# Patient Record
Sex: MALE | Race: WHITE | NOT HISPANIC OR LATINO | URBAN - METROPOLITAN AREA
[De-identification: names, ages, dates, MRNs, and addresses within clinical notes are randomized per-mention and may not be internally consistent; named-entity substitution may affect disease eponyms.]

---

## 2017-11-20 ENCOUNTER — OUTPATIENT (OUTPATIENT)
Dept: OUTPATIENT SERVICES | Facility: HOSPITAL | Age: 37
LOS: 1 days | Discharge: HOME | End: 2017-11-20

## 2018-01-10 ENCOUNTER — OUTPATIENT (OUTPATIENT)
Dept: OUTPATIENT SERVICES | Facility: HOSPITAL | Age: 38
LOS: 1 days | Discharge: HOME | End: 2018-01-10

## 2018-10-23 ENCOUNTER — EMERGENCY (EMERGENCY)
Facility: HOSPITAL | Age: 38
LOS: 0 days | Discharge: HOME | End: 2018-10-23
Admitting: PHYSICIAN ASSISTANT

## 2018-10-23 VITALS
TEMPERATURE: 98 F | HEART RATE: 76 BPM | RESPIRATION RATE: 18 BRPM | DIASTOLIC BLOOD PRESSURE: 84 MMHG | SYSTOLIC BLOOD PRESSURE: 144 MMHG | OXYGEN SATURATION: 99 %

## 2018-10-23 VITALS — HEIGHT: 72 IN | WEIGHT: 190.04 LBS

## 2018-10-23 DIAGNOSIS — K08.89 OTHER SPECIFIED DISORDERS OF TEETH AND SUPPORTING STRUCTURES: ICD-10-CM

## 2018-10-23 DIAGNOSIS — Z79.1 LONG TERM (CURRENT) USE OF NON-STEROIDAL ANTI-INFLAMMATORIES (NSAID): ICD-10-CM

## 2018-10-23 DIAGNOSIS — K03.81 CRACKED TOOTH: ICD-10-CM

## 2018-10-23 DIAGNOSIS — Z88.0 ALLERGY STATUS TO PENICILLIN: ICD-10-CM

## 2018-10-23 NOTE — ED PROVIDER NOTE - NS ED ROS FT
Constitutional: no fever, chills   ENT: dental pain tooth #7, + right sided facial swelling. no throat pain, no change in voice, no excessive drooling, no ear pain/discharge, no hearing changes.   Cardiovascular: no chest pain, no sob  Respiratory: no cough, no shortness of breath  Gastrointestinal: no nausea, vomiting or abd pain  Neurological: no headache, no dizziness, no visual changes, no UE/LE weakness or paresthesias. no change in mental status. no neck pain or stiffness.

## 2018-10-23 NOTE — ED PROVIDER NOTE - MEDICAL DECISION MAKING DETAILS
Patient here for dental pain tooth #7, cracked tooth several months ago, now pain and right sided facial swelling. No fever. Patient transferred to dental clinic for further evaluation and treatment.

## 2018-10-23 NOTE — PROGRESS NOTE ADULT - SUBJECTIVE AND OBJECTIVE BOX
Patient is a 38y old  Male who presents with a chief complaint of pain and swelling on right side of face    HPI: caries on one of the front tooth and started swelling 2-3 days ago      PAST MEDICAL & SURGICAL HISTORY:  IV drug abuse  S/P small bowel resection    (   ) heart valve replacement  (   ) joint replacement  (   ) pregnancy    MEDICATIONS  (STANDING):    MEDICATIONS  (PRN):      REVIEW OF SYSTEMS      General:	    Skin/Breast:  	  Ophthalmologic:  	  ENMT:	    Respiratory and Thorax:  	  Cardiovascular:	    Gastrointestinal:	    Genitourinary:	    Musculoskeletal:	    Neurological:	    Psychiatric:	    Hematology/Lymphatics:	    Endocrine:	    Allergic/Immunologic:	    Allergies    penicillin (Rash)    Intolerances        FAMILY HISTORY:      *SOCIAL HISTORY: (   ) Tobacco; (   ) ETOH    Vital Signs Last 24 Hrs  T(C): 36.7 (23 Oct 2018 08:47), Max: 36.7 (23 Oct 2018 08:47)  T(F): 98 (23 Oct 2018 08:47), Max: 98 (23 Oct 2018 08:47)  HR: 76 (23 Oct 2018 08:47) (76 - 76)  BP: 144/84 (23 Oct 2018 08:47) (144/84 - 144/84)  BP(mean): --  RR: 18 (23 Oct 2018 08:47) (18 - 18)  SpO2: 99% (23 Oct 2018 08:47) (99% - 99%)    LABS:                  Last Dental Visit: << about an year ago as emergency  >>    EOE:  TMJ (   ) clicks                     (   ) pops                     (   ) crepitus             Mandible <<FROM>>             Facial bones and MOM <<grossly intact>>             (   ) trismus             (   ) lymphadenopathy             (   ) swelling             (   ) asymmetry             (   ) palpation             (   ) dyspnea             (   ) dysphagia             (   ) loss of consciousness    IOE:  <<permanent/primary/mixed>> dentition:            <<grossly intact>> OR             <<multiple carious teeth>> OR             <<multiple missing teeth>>             Dentition Present <<  >>                     Mobility <<  >>                     Caries <<  >>                hard/soft palate:  (   ) palatal torus, <<No pathology noted>>            tongue/FOM <<No pathology noted>>            labial/buccal mucosa <<No pathology noted>>           (   ) percussion           (   ) palpation           (   ) swelling            (   ) abscess           (   ) sinus tract    *DENTAL RADIOGRAPHS: 1 periapical x-ray taken    RADIOLOGY & ADDITIONAL STUDIES:    *ASSESSMENT: #7 caries into pulp with periapical lesion    *PLAN: extraction #7    PROCEDURE:   Verbal and written consent given. consequences and risk discussed as per OS sheet dated 07/13/00. consent and site side completed  Anesthesia: << 1 carpule of 3% mepivicaine HCl infiltrated buccal of #7. 1 carpule of 4 % spetocaine 1:100,000 epinephrine infiltrated buccal and palatal of #7.    >>   Treatment: << anesthetic given. elevated and simple extraction. curetted socket and irrigated with saline water. post-op x-ray taken and post-op instruction given. prescribed 300 mg clindamycin every 6 hours for 7 days and 600 mg ibuprofen every 6 hours as needed.   >>     RECOMMENDATIONS:  1) << follow post-op instruction given and taken prescribed medication as directed.  >>  2) Dental F/U with outpatient dentist for comprehensive dental care.   3) If any difficulty swallowing/breathing, fever occur, return to ER.     Resident Name, Marlyn Frazier

## 2018-10-23 NOTE — ED PROVIDER NOTE - PHYSICAL EXAMINATION
GENERAL:  well appearing, non-toxic male in no acute distress  SKIN: skin warm, pink and dry. MMM. + right sided upper facial swelling  ENT:  Airway intact.+ right facial swelling, + cracked tooth # 7, +TTP tooth #7. no trismus. no change in voice.  Patent oropharynx without erythema or exudate. Uvula midline. TMs clear b/l.   NECK: Neck supple. No meningismus. Trachea midline  PULM: CTAB. Normal respiratory effort. No respiratory distress. No wheezes, stridor, rales or rhonchi. No retractions  CV: RRR, no M/R/G.   ABD: Soft, non-tender, non-distended  NEURO: A+Ox3, no sensory/motor deficits

## 2018-10-23 NOTE — ED PROVIDER NOTE - OBJECTIVE STATEMENT
37 yo male with no significant pmh presents to the ED c/o right upper frontal dental pain x several days. Associated with right sided upper facial swelling. Patient admits he cracked his tooth months ago but never followed up with dentist. Patient had extra clinda at home took 1 dose last night and one this morning. Denies fever, chills, throat pain, change in voice, excessive drooling, chest pain, sob, abd pain, N/V.